# Patient Record
Sex: MALE | HISPANIC OR LATINO | Employment: FULL TIME | URBAN - METROPOLITAN AREA
[De-identification: names, ages, dates, MRNs, and addresses within clinical notes are randomized per-mention and may not be internally consistent; named-entity substitution may affect disease eponyms.]

---

## 2020-08-21 ENCOUNTER — HOSPITAL ENCOUNTER (INPATIENT)
Facility: HOSPITAL | Age: 30
LOS: 1 days | Discharge: HOME | DRG: 603 | End: 2020-08-25
Attending: EMERGENCY MEDICINE | Admitting: EMERGENCY MEDICINE

## 2020-08-21 DIAGNOSIS — L03.116 CELLULITIS OF LEFT LEG: Primary | ICD-10-CM

## 2020-08-21 DIAGNOSIS — M79.606 LEG PAIN: ICD-10-CM

## 2020-08-21 DIAGNOSIS — L02.419 ABSCESS OF LOWER LEG: ICD-10-CM

## 2020-08-21 LAB
ALBUMIN SERPL BCP-MCNC: 4.7 G/DL (ref 3.5–5.2)
ALP SERPL-CCNC: 82 U/L (ref 55–135)
ALT SERPL W/O P-5'-P-CCNC: 29 U/L (ref 10–44)
ANION GAP SERPL CALC-SCNC: 14 MMOL/L (ref 8–16)
AST SERPL-CCNC: 32 U/L (ref 10–40)
BASOPHILS # BLD AUTO: 0.05 K/UL (ref 0–0.2)
BASOPHILS NFR BLD: 0.4 % (ref 0–1.9)
BILIRUB SERPL-MCNC: 0.5 MG/DL (ref 0.1–1)
BUN SERPL-MCNC: 11 MG/DL (ref 6–20)
CALCIUM SERPL-MCNC: 9.5 MG/DL (ref 8.7–10.5)
CHLORIDE SERPL-SCNC: 103 MMOL/L (ref 95–110)
CO2 SERPL-SCNC: 21 MMOL/L (ref 23–29)
CREAT SERPL-MCNC: 0.9 MG/DL (ref 0.5–1.4)
D DIMER PPP IA.FEU-MCNC: 0.51 MG/L FEU
DIFFERENTIAL METHOD: ABNORMAL
EOSINOPHIL # BLD AUTO: 0.1 K/UL (ref 0–0.5)
EOSINOPHIL NFR BLD: 0.9 % (ref 0–8)
ERYTHROCYTE [DISTWIDTH] IN BLOOD BY AUTOMATED COUNT: 13.3 % (ref 11.5–14.5)
EST. GFR  (AFRICAN AMERICAN): >60 ML/MIN/1.73 M^2
EST. GFR  (NON AFRICAN AMERICAN): >60 ML/MIN/1.73 M^2
GLUCOSE SERPL-MCNC: 93 MG/DL (ref 70–110)
HCT VFR BLD AUTO: 39.1 % (ref 40–54)
HGB BLD-MCNC: 13.4 G/DL (ref 14–18)
IMM GRANULOCYTES # BLD AUTO: 0.04 K/UL (ref 0–0.04)
IMM GRANULOCYTES NFR BLD AUTO: 0.3 % (ref 0–0.5)
LACTATE SERPL-SCNC: 1.2 MMOL/L (ref 0.5–2.2)
LYMPHOCYTES # BLD AUTO: 1.9 K/UL (ref 1–4.8)
LYMPHOCYTES NFR BLD: 13.5 % (ref 18–48)
MCH RBC QN AUTO: 28.1 PG (ref 27–31)
MCHC RBC AUTO-ENTMCNC: 34.3 G/DL (ref 32–36)
MCV RBC AUTO: 82 FL (ref 82–98)
MONOCYTES # BLD AUTO: 1.3 K/UL (ref 0.3–1)
MONOCYTES NFR BLD: 9.5 % (ref 4–15)
NEUTROPHILS # BLD AUTO: 10.6 K/UL (ref 1.8–7.7)
NEUTROPHILS NFR BLD: 75.4 % (ref 38–73)
NRBC BLD-RTO: 0 /100 WBC
PLATELET # BLD AUTO: 391 K/UL (ref 150–350)
PMV BLD AUTO: 10 FL (ref 9.2–12.9)
POTASSIUM SERPL-SCNC: 3.7 MMOL/L (ref 3.5–5.1)
PROT SERPL-MCNC: 8.2 G/DL (ref 6–8.4)
RBC # BLD AUTO: 4.77 M/UL (ref 4.6–6.2)
SODIUM SERPL-SCNC: 138 MMOL/L (ref 136–145)
WBC # BLD AUTO: 14.06 K/UL (ref 3.9–12.7)

## 2020-08-21 PROCEDURE — 90471 IMMUNIZATION ADMIN: CPT | Performed by: EMERGENCY MEDICINE

## 2020-08-21 PROCEDURE — 96375 TX/PRO/DX INJ NEW DRUG ADDON: CPT

## 2020-08-21 PROCEDURE — 99285 EMERGENCY DEPT VISIT HI MDM: CPT | Mod: 25

## 2020-08-21 PROCEDURE — 83605 ASSAY OF LACTIC ACID: CPT

## 2020-08-21 PROCEDURE — 80053 COMPREHEN METABOLIC PANEL: CPT

## 2020-08-21 PROCEDURE — 87040 BLOOD CULTURE FOR BACTERIA: CPT | Mod: 59

## 2020-08-21 PROCEDURE — 85379 FIBRIN DEGRADATION QUANT: CPT

## 2020-08-21 PROCEDURE — 85025 COMPLETE CBC W/AUTO DIFF WBC: CPT

## 2020-08-21 PROCEDURE — 63600175 PHARM REV CODE 636 W HCPCS: Performed by: EMERGENCY MEDICINE

## 2020-08-21 PROCEDURE — 25000003 PHARM REV CODE 250: Performed by: EMERGENCY MEDICINE

## 2020-08-21 PROCEDURE — 90714 TD VACC NO PRESV 7 YRS+ IM: CPT | Performed by: EMERGENCY MEDICINE

## 2020-08-21 PROCEDURE — 96365 THER/PROPH/DIAG IV INF INIT: CPT

## 2020-08-21 RX ORDER — ONDANSETRON 2 MG/ML
4 INJECTION INTRAMUSCULAR; INTRAVENOUS
Status: COMPLETED | OUTPATIENT
Start: 2020-08-21 | End: 2020-08-21

## 2020-08-21 RX ORDER — MORPHINE SULFATE 10 MG/ML
4 INJECTION INTRAMUSCULAR; INTRAVENOUS; SUBCUTANEOUS
Status: COMPLETED | OUTPATIENT
Start: 2020-08-21 | End: 2020-08-21

## 2020-08-21 RX ORDER — KETOROLAC TROMETHAMINE 30 MG/ML
15 INJECTION, SOLUTION INTRAMUSCULAR; INTRAVENOUS
Status: COMPLETED | OUTPATIENT
Start: 2020-08-21 | End: 2020-08-21

## 2020-08-21 RX ORDER — HYDROCODONE BITARTRATE AND ACETAMINOPHEN 5; 325 MG/1; MG/1
1 TABLET ORAL
Status: COMPLETED | OUTPATIENT
Start: 2020-08-21 | End: 2020-08-21

## 2020-08-21 RX ADMIN — CEFTRIAXONE 1 G: 1 INJECTION, SOLUTION INTRAVENOUS at 09:08

## 2020-08-21 RX ADMIN — CLOSTRIDIUM TETANI TOXOID ANTIGEN (FORMALDEHYDE INACTIVATED) AND CORYNEBACTERIUM DIPHTHERIAE TOXOID ANTIGEN (FORMALDEHYDE INACTIVATED) 0.5 ML: 5; 2 INJECTION, SUSPENSION INTRAMUSCULAR at 09:08

## 2020-08-21 RX ADMIN — IOHEXOL 80 ML: 350 INJECTION, SOLUTION INTRAVENOUS at 11:08

## 2020-08-21 RX ADMIN — HYDROCODONE BITARTRATE AND ACETAMINOPHEN 1 TABLET: 5; 325 TABLET ORAL at 09:08

## 2020-08-21 RX ADMIN — MORPHINE SULFATE 4 MG: 10 INJECTION, SOLUTION INTRAMUSCULAR; INTRAVENOUS at 10:08

## 2020-08-21 RX ADMIN — KETOROLAC TROMETHAMINE 15 MG: 30 INJECTION, SOLUTION INTRAMUSCULAR at 09:08

## 2020-08-21 RX ADMIN — ONDANSETRON 4 MG: 2 INJECTION INTRAMUSCULAR; INTRAVENOUS at 10:08

## 2020-08-21 RX ADMIN — VANCOMYCIN HYDROCHLORIDE 1750 MG: 100 INJECTION, POWDER, LYOPHILIZED, FOR SOLUTION INTRAVENOUS at 10:08

## 2020-08-22 PROBLEM — L02.419 ABSCESS OF LOWER LEG: Status: ACTIVE | Noted: 2020-08-22

## 2020-08-22 LAB — SARS-COV-2 RDRP RESP QL NAA+PROBE: NEGATIVE

## 2020-08-22 PROCEDURE — 63600175 PHARM REV CODE 636 W HCPCS: Performed by: EMERGENCY MEDICINE

## 2020-08-22 PROCEDURE — 25000003 PHARM REV CODE 250: Performed by: STUDENT IN AN ORGANIZED HEALTH CARE EDUCATION/TRAINING PROGRAM

## 2020-08-22 PROCEDURE — U0002 COVID-19 LAB TEST NON-CDC: HCPCS

## 2020-08-22 PROCEDURE — 96375 TX/PRO/DX INJ NEW DRUG ADDON: CPT

## 2020-08-22 PROCEDURE — 25500020 PHARM REV CODE 255: Performed by: EMERGENCY MEDICINE

## 2020-08-22 PROCEDURE — 63600175 PHARM REV CODE 636 W HCPCS: Performed by: SURGERY

## 2020-08-22 PROCEDURE — 87186 SC STD MICRODIL/AGAR DIL: CPT

## 2020-08-22 PROCEDURE — 25000003 PHARM REV CODE 250: Performed by: EMERGENCY MEDICINE

## 2020-08-22 PROCEDURE — 94761 N-INVAS EAR/PLS OXIMETRY MLT: CPT

## 2020-08-22 PROCEDURE — G0378 HOSPITAL OBSERVATION PER HR: HCPCS

## 2020-08-22 PROCEDURE — 25000003 PHARM REV CODE 250: Performed by: SURGERY

## 2020-08-22 PROCEDURE — 87075 CULTR BACTERIA EXCEPT BLOOD: CPT

## 2020-08-22 PROCEDURE — 87077 CULTURE AEROBIC IDENTIFY: CPT

## 2020-08-22 PROCEDURE — 87070 CULTURE OTHR SPECIMN AEROBIC: CPT

## 2020-08-22 PROCEDURE — 96376 TX/PRO/DX INJ SAME DRUG ADON: CPT

## 2020-08-22 RX ORDER — SODIUM CHLORIDE 9 MG/ML
INJECTION, SOLUTION INTRAVENOUS CONTINUOUS
Status: DISCONTINUED | OUTPATIENT
Start: 2020-08-22 | End: 2020-08-22

## 2020-08-22 RX ORDER — ACETAMINOPHEN 325 MG/1
650 TABLET ORAL EVERY 6 HOURS
Status: DISCONTINUED | OUTPATIENT
Start: 2020-08-22 | End: 2020-08-22

## 2020-08-22 RX ORDER — IBUPROFEN 400 MG/1
800 TABLET ORAL
Status: COMPLETED | OUTPATIENT
Start: 2020-08-22 | End: 2020-08-22

## 2020-08-22 RX ORDER — LIDOCAINE HYDROCHLORIDE AND EPINEPHRINE 10; 10 MG/ML; UG/ML
30 INJECTION, SOLUTION INFILTRATION; PERINEURAL ONCE
Status: COMPLETED | OUTPATIENT
Start: 2020-08-22 | End: 2020-08-22

## 2020-08-22 RX ORDER — MORPHINE SULFATE 10 MG/ML
2 INJECTION INTRAMUSCULAR; INTRAVENOUS; SUBCUTANEOUS EVERY 4 HOURS PRN
Status: DISCONTINUED | OUTPATIENT
Start: 2020-08-22 | End: 2020-08-22

## 2020-08-22 RX ORDER — ACETAMINOPHEN 325 MG/1
650 TABLET ORAL EVERY 6 HOURS
Status: DISCONTINUED | OUTPATIENT
Start: 2020-08-22 | End: 2020-08-25 | Stop reason: HOSPADM

## 2020-08-22 RX ORDER — HYDROMORPHONE HYDROCHLORIDE 2 MG/ML
0.5 INJECTION, SOLUTION INTRAMUSCULAR; INTRAVENOUS; SUBCUTANEOUS ONCE
Status: COMPLETED | OUTPATIENT
Start: 2020-08-22 | End: 2020-08-22

## 2020-08-22 RX ORDER — IBUPROFEN 400 MG/1
400 TABLET ORAL EVERY 6 HOURS PRN
Status: DISCONTINUED | OUTPATIENT
Start: 2020-08-22 | End: 2020-08-25 | Stop reason: HOSPADM

## 2020-08-22 RX ORDER — OXYCODONE HYDROCHLORIDE 5 MG/1
5 TABLET ORAL EVERY 6 HOURS PRN
Status: DISCONTINUED | OUTPATIENT
Start: 2020-08-22 | End: 2020-08-25 | Stop reason: HOSPADM

## 2020-08-22 RX ORDER — ACETAMINOPHEN 325 MG/1
650 TABLET ORAL EVERY 8 HOURS PRN
Status: DISCONTINUED | OUTPATIENT
Start: 2020-08-22 | End: 2020-08-25 | Stop reason: HOSPADM

## 2020-08-22 RX ORDER — MORPHINE SULFATE 10 MG/ML
4 INJECTION INTRAMUSCULAR; INTRAVENOUS; SUBCUTANEOUS EVERY 4 HOURS PRN
Status: DISCONTINUED | OUTPATIENT
Start: 2020-08-22 | End: 2020-08-22

## 2020-08-22 RX ORDER — ONDANSETRON 2 MG/ML
4 INJECTION INTRAMUSCULAR; INTRAVENOUS EVERY 8 HOURS PRN
Status: DISCONTINUED | OUTPATIENT
Start: 2020-08-22 | End: 2020-08-25 | Stop reason: HOSPADM

## 2020-08-22 RX ADMIN — OXYCODONE HYDROCHLORIDE 5 MG: 5 TABLET ORAL at 05:08

## 2020-08-22 RX ADMIN — ACETAMINOPHEN 650 MG: 325 TABLET ORAL at 08:08

## 2020-08-22 RX ADMIN — ACETAMINOPHEN 650 MG: 325 TABLET ORAL at 11:08

## 2020-08-22 RX ADMIN — ACETAMINOPHEN 650 MG: 325 TABLET ORAL at 03:08

## 2020-08-22 RX ADMIN — IBUPROFEN 800 MG: 400 TABLET, FILM COATED ORAL at 01:08

## 2020-08-22 RX ADMIN — SODIUM CHLORIDE: 0.9 INJECTION, SOLUTION INTRAVENOUS at 04:08

## 2020-08-22 RX ADMIN — DEXTROSE MONOHYDRATE 1500 MG: 50 INJECTION, SOLUTION INTRAVENOUS at 11:08

## 2020-08-22 RX ADMIN — LIDOCAINE HYDROCHLORIDE AND EPINEPHRINE 30 ML: 10; 10 INJECTION, SOLUTION INFILTRATION; PERINEURAL at 09:08

## 2020-08-22 RX ADMIN — CEFTRIAXONE 1 G: 1 INJECTION, SOLUTION INTRAVENOUS at 09:08

## 2020-08-22 RX ADMIN — MORPHINE SULFATE 2 MG: 10 INJECTION, SOLUTION INTRAMUSCULAR; INTRAVENOUS at 04:08

## 2020-08-22 RX ADMIN — HYDROMORPHONE HYDROCHLORIDE 0.5 MG: 2 INJECTION, SOLUTION INTRAMUSCULAR; INTRAVENOUS; SUBCUTANEOUS at 09:08

## 2020-08-22 NOTE — PROGRESS NOTES
Pharmacokinetic Initial Assessment: IV Vancomycin    Assessment/Plan:    Initiate intravenous vancomycin with loading dose of 1750 mg once followed by a maintenance dose of vancomycin 1500 mg IV every 12 hours  Desired empiric serum trough concentration is 10 to 20 mcg/mL  Draw vancomycin trough level 30 min prior to fourth dose on 8/23/2020 at approximately 1030  Pharmacy will continue to follow and monitor vancomycin.      Please contact pharmacy at extension [phone] with any questions regarding this assessment.     Thank you for the consult,   Reddy Klein       Patient brief summary:  Francisco Diaz is a 30 y.o. male initiated on antimicrobial therapy with IV Vancomycin for treatment of suspected skin & soft tissue infection    Drug Allergies:   Review of patient's allergies indicates:  No Known Allergies    Actual Body Weight:   83.9 kg    Renal Function:   Estimated Creatinine Clearance: 117.5 mL/min (based on SCr of 0.9 mg/dL).,     Dialysis Method (if applicable):  N/A    CBC (last 72 hours):  Recent Labs   Lab Result Units 08/21/20  2109   WBC K/uL 14.06*   Hemoglobin g/dL 13.4*   Hematocrit % 39.1*   Platelets K/uL 391*   Gran% % 75.4*   Lymph% % 13.5*   Mono% % 9.5   Eosinophil% % 0.9   Basophil% % 0.4   Differential Method  Automated       Metabolic Panel (last 72 hours):  Recent Labs   Lab Result Units 08/21/20  2109   Sodium mmol/L 138   Potassium mmol/L 3.7   Chloride mmol/L 103   CO2 mmol/L 21*   Glucose mg/dL 93   BUN, Bld mg/dL 11   Creatinine mg/dL 0.9   Albumin g/dL 4.7   Total Bilirubin mg/dL 0.5   Alkaline Phosphatase U/L 82   AST U/L 32   ALT U/L 29       Drug levels (last 3 results):  No results for input(s): VANCOMYCINRA, VANCOMYCINPE, VANCOMYCINTR in the last 72 hours.    Microbiologic Results:  Microbiology Results (last 7 days)       Procedure Component Value Units Date/Time    Blood Culture #1 **CANNOT BE ORDERED STAT** [[id number]] Collected: 08/21/20 2110    Order  Status: Sent Specimen: Blood from Peripheral, Hand, Right Updated: 08/21/20 2137    Blood Culture #1 **CANNOT BE ORDERED STAT** [[id number]] Collected: 08/21/20 2100    Order Status: Sent Specimen: Blood from Peripheral, Hand, Left Updated: 08/21/20 2137

## 2020-08-22 NOTE — PLAN OF CARE
Patient is alert and oriented x 4.  Patient remained in low and locked position.  Call light within reach.  IV hydration initiated. Analgesic on plan of care.  Patient medicated once throughout shift.        Problem: Adult Inpatient Plan of Care  Goal: Plan of Care Review  Outcome: Ongoing, Not Progressing  Goal: Patient-Specific Goal (Individualization)  Outcome: Ongoing, Not Progressing  Goal: Absence of Hospital-Acquired Illness or Injury  Outcome: Ongoing, Not Progressing  Goal: Optimal Comfort and Wellbeing  Outcome: Ongoing, Not Progressing  Goal: Readiness for Transition of Care  Outcome: Ongoing, Not Progressing  Goal: Rounds/Family Conference  Outcome: Ongoing, Not Progressing

## 2020-08-22 NOTE — PLAN OF CARE
Patient remained free from falls and injury during shift, medication administered per MD orders,  prn medication administered with moderate relief, Incision and drainage done at bedside with Martti used during procedure with physician, patient tolerated well, patient is able to make needs known through out shift, patient safety maintained, bed in low locked position with call bell in reach, will continue to monitor.    Problem: Adult Inpatient Plan of Care  Goal: Plan of Care Review  Outcome: Ongoing, Progressing  Goal: Patient-Specific Goal (Individualization)  Outcome: Ongoing, Progressing  Goal: Absence of Hospital-Acquired Illness or Injury  Outcome: Ongoing, Progressing  Goal: Optimal Comfort and Wellbeing  Outcome: Ongoing, Progressing  Goal: Readiness for Transition of Care  Outcome: Ongoing, Progressing  Goal: Rounds/Family Conference  Outcome: Ongoing, Progressing

## 2020-08-22 NOTE — NURSING
Patient arrived on unit awake, alert and oriented.  IV sited noted.  Respirations are even and unlabored.  Patient is on RA.  Skin left left abrasion cellulitis.  Vitals assessed and patient oriented to room.  Bed in low and locked positon for patient's safety.  MARTTIused to complete admission.  IV hydration initiated and patient is NPO awaiting surgery consult.

## 2020-08-22 NOTE — ED NOTES
Pt cut L leg 2 weeks ago pain/swelling worsening over last 2 weeks until today pt unable to support weight on L leg.

## 2020-08-22 NOTE — ED PROVIDER NOTES
"Encounter Date: 8/21/2020    SCRIBE #1 NOTE: I, Betsy Tiff, am scribing for, and in the presence of,  Wang Azul MD. I have scribed the entire note.       History     Chief Complaint   Patient presents with    Leg Pain     Pateint c/o left leg swelling x 2 weeks secondary to "falling from car" and injuring leg.  Patient reports that he was able to walk, but now unable to place pressure on leg.  Left anterior leg red, swollen, and hot.      This is a 31 y/o male presenting to the ED with a CC of increasing left leg pain that began 2 weeks ago. Patient reports he had a superficial laceration to his left shin that has been healing.  Initially had significant bruising that resolved.  This week he developed some redness and mild pain.   Symptoms have been worsening slowly over the course of two weeks. He took 2 Tylenol without relief.  He has been able to walk due to the pain.  He admits of radiation of pain into the left thigh.  Admits of mild swelling of 1 of the lymph nodes in his left groin.  He denies fever or chills.  No other complaints.  Last Tetanus shot was about 20 years ago in [place]. He is not on any prescription medications. NKDA.  Patient is Lib[place]-speaking only.  History taking using language line.    The history is provided by the patient. The history is limited by a language barrier ([place]).     Review of patient's allergies indicates:  No Known Allergies  History reviewed. No pertinent past medical history.  History reviewed. No pertinent surgical history.  History reviewed. No pertinent family history.  Social History     Tobacco Use    Smoking status: Never Smoker    Smokeless tobacco: Never Used   Substance Use Topics    Alcohol use: Yes     Alcohol/week: 3.0 standard drinks     Types: 3 Cans of beer per week    Drug use: Never     Review of Systems   Constitutional: Negative for chills, diaphoresis and fever.   HENT: Negative for congestion, sinus pain and sore throat.  "   Respiratory: Negative for cough and shortness of breath.    Cardiovascular: Positive for leg swelling.   Gastrointestinal: Negative for abdominal pain, diarrhea, nausea and vomiting.   Genitourinary: Negative for dysuria.   Musculoskeletal: Negative for back pain and joint swelling.        Positive for left leg pain   Skin: Positive for rash and wound.        Positive for redness to left leg  Positive for bruising to left leg  Positive for healing laceration to left leg   Neurological: Negative for weakness, numbness and headaches.       Physical Exam     Initial Vitals [08/21/20 2045]   BP Pulse Resp Temp SpO2   (!) 143/83 107 (!) 22 99.1 °F (37.3 °C) 99 %      MAP       --         Physical Exam    Nursing note and vitals reviewed.  Constitutional: He appears well-developed and well-nourished. He is not diaphoretic. No distress.   Patient appears uncomfortable due to pain.   HENT:   Head: Normocephalic and atraumatic.   Right Ear: External ear normal.   Left Ear: External ear normal.   Nose: Nose normal.   Eyes: Conjunctivae and EOM are normal. Pupils are equal, round, and reactive to light. Right eye exhibits no discharge. Left eye exhibits no discharge. No scleral icterus.   Neck: Normal range of motion. Neck supple. No tracheal deviation present.   Cardiovascular: Normal rate, regular rhythm and normal heart sounds.   No murmur heard.  Pulmonary/Chest: Breath sounds normal. No respiratory distress. He has no wheezes. He has no rhonchi. He has no rales.   Abdominal: Soft. Bowel sounds are normal. He exhibits no distension. There is no abdominal tenderness. There is no rebound and no guarding.   Musculoskeletal: Normal range of motion.      Comments: Normal left knee.  Normal left hip.  Normal left ankle.  Moderate swelling over the left shin with fluctuance.  No drainage.  No pustules.  No vesicles.   Neurological: He is alert and oriented to person, place, and time. No cranial nerve deficit.   Skin: Skin is  warm and dry. Bruising and laceration noted. No rash noted. There is erythema. No pallor.   Healing laceration to left shin with moderate swelling, bruising, and redness  Left shin is tender up to knee   Psychiatric: He has a normal mood and affect. His behavior is normal. Judgment and thought content normal.         ED Course   Procedures  Labs Reviewed   CULTURE, BLOOD   CULTURE, BLOOD   CBC W/ AUTO DIFFERENTIAL   COMPREHENSIVE METABOLIC PANEL   D DIMER, QUANTITATIVE   LACTIC ACID, PLASMA          Imaging Results    None          Medical Decision Making:   Initial Assessment:   This is a 29 y/o male presenting to the ED with a CC of increasing left leg pain that began 2 weeks ago.  Exam consistent with abscess versus cellulitis of the soft tissues of the left lower extremity.  Patient is afebrile.  Patient mildly tachycardic.  Labs and x-rays will be ordered. Patient will be treated for symptoms and reassessed.     Differential Diagnosis:   Cellulitis, abscess, DVT  Clinical Tests:   Lab Tests: Ordered and Reviewed  Radiological Study: Ordered and Reviewed  ED Management:  0100:  No evidence of DVT.  No evidence of fracture.  White blood cell count moderately elevated.  Remainder of lab work is unremarkable.  CT shows large fluid collection over the anterior leg consistent with abscess and cellulitis.  Given the severity of pain associated with the abscess the size of the fluid collection I will admit the patient for surgical consult for incision and drainage and continue dosing of antibiotics to cover for likely skin pathogens such as staphylococcus and Streptococcus.            Scribe Attestation:   Scribe #1: I performed the above scribed service and the documentation accurately describes the services I performed. I attest to the accuracy of the note.                          Clinical Impression:       ICD-10-CM ICD-9-CM   1. Cellulitis of left leg  L03.116 682.6   2. Leg pain  M79.606 729.5   3. Abscess of  lower leg  L02.419 682.6         Disposition:   Disposition: Placed in Observation  Condition: Stable     I, Wang Azul MD, personally performed the services described in this documentation. All medical record entries made by the scribe were at my direction and in my presence.  I have reviewed the chart and agree that the record reflects my personal performance and is accurate and complete.                      Wang Azul MD  08/22/20 0102

## 2020-08-22 NOTE — H&P
[facility]  History & Physical    SUBJECTIVE:     Chief Complaint/Reason for Admission: LLE abscess with cellulitis    History of Present Illness:  Francisco Diaz is a 30 y.o. male with no significant PMHx, who presents with LLE swelling, erythema, and pain. He states that his symptoms onset approximately 2 weeks ago after he fell off the back of a truck and hit his anteromedial leg on the bumper. His symptoms progressively worsened. He states he is unable to walk due to the pain. He is able to bend his knee. He denies associated fever, chills, nausea, vomiting, or other systemic symptoms.     PMHx: No significant PMHX  PSHx: No significant PSHx  SHx: 3 beers daily  FMHx: Non-contirubtory  Meds: None  Allergies: NKDA     Review of Systems:  Constitutional: no fever or chills  Eyes: no visual changes  ENT: no nasal congestion or sore throat  Respiratory: no cough or shortness of breath  Cardiovascular: no chest pain or palpitations  Gastrointestinal: no nausea or vomiting, no abdominal pain or change in bowel habits  Genitourinary: no hematuria or dysuria  Hematologic/Lymphatic: no easy bruising or lymphadenopathy  Musculoskeletal: positive for LLE swelling, erythema, pain  Neurological: no seizures or tremors  Behavioral/Psych: no auditory or visual hallucinations  Endocrine: no heat or cold intolerance    OBJECTIVE:     Vital Signs (Most Recent):  Temp: 98.9 °F (37.2 °C) (08/22/20 0722)  Pulse: 86 (08/22/20 0722)  Resp: 17 (08/22/20 0722)  BP: 119/80 (08/22/20 0722)  SpO2: 100 % (08/22/20 0722)    Physical Exam:  General: Well developed, well nourished. Alert and in NAD.  HEENT: NCAT. PERRL. EOM grossly intact. Moist mucus membranes. No erythema or exudates in the posterior oropharynx. No cervical lymphadenopathy. No thyromegaly.   Cardiovasular: RRR with no M/R/G. DP 2+ bilaterally.  Pulmonary: CTAB without wheezes, rhales, or rhonchi. No accessory muscle use.  Abdominal: Soft,  non-tender and non-distended. Normal bowel sounds.  Genitourinary: Deferred.  Musculoskeletal: Full ROM. Normal muscle tone. No BLE edema.  Neurological: A&O x 4. CN II-XII grossly intact.  Skin:   LLE: proximal anteromedial leg has an area of fluctuance, erythema, tenderness, and superficial healing abrasion.  Psych: Appropriate mood and affect.     Laboratory:  CBC:   Recent Labs   Lab 08/21/20 2109   WBC 14.06*   RBC 4.77   HGB 13.4*   HCT 39.1*   *   MCV 82   MCH 28.1   MCHC 34.3     BMP:   Recent Labs   Lab 08/21/20 2109   GLU 93      K 3.7      CO2 21*   BUN 11   CREATININE 0.9   CALCIUM 9.5       Diagnostic Results:  CT LLE: 2.3 x 6.1 x 7.8 cm fluid collection    ASSESSMENT/PLAN:   Francisco Diaz is a 30 y.o. male with no significant PMHx, who presents with LLE swelling, erythema, and pain onset following a fall 2 weeks ago. Febrile to 102.2. Exam with fluctuance and erythema over the anteromedial leg. CT with 2.3 x 6.1 x 7.8 cm fluid collection    - Admit  - Continue empiric Vanc/Rocephin  - I&D at bedside today  - Pain: APAP, Oxy    H&P obtained using hospital provided translation service.    Cornel Peterson MD PGY3  [facility] General Surgery  8:10 AM 8/22/2020

## 2020-08-22 NOTE — ED NOTES
Report called to 4th floor RN. VSS with elevated temp noted. Awaiting transport to floor at this time

## 2020-08-23 LAB
ANION GAP SERPL CALC-SCNC: 9 MMOL/L (ref 8–16)
BASOPHILS # BLD AUTO: 0.03 K/UL (ref 0–0.2)
BASOPHILS NFR BLD: 0.2 % (ref 0–1.9)
BUN SERPL-MCNC: 9 MG/DL (ref 6–20)
CALCIUM SERPL-MCNC: 9.2 MG/DL (ref 8.7–10.5)
CHLORIDE SERPL-SCNC: 100 MMOL/L (ref 95–110)
CO2 SERPL-SCNC: 24 MMOL/L (ref 23–29)
CREAT SERPL-MCNC: 0.8 MG/DL (ref 0.5–1.4)
DIFFERENTIAL METHOD: ABNORMAL
EOSINOPHIL # BLD AUTO: 0 K/UL (ref 0–0.5)
EOSINOPHIL NFR BLD: 0.3 % (ref 0–8)
ERYTHROCYTE [DISTWIDTH] IN BLOOD BY AUTOMATED COUNT: 13.2 % (ref 11.5–14.5)
EST. GFR  (AFRICAN AMERICAN): >60 ML/MIN/1.73 M^2
EST. GFR  (NON AFRICAN AMERICAN): >60 ML/MIN/1.73 M^2
GLUCOSE SERPL-MCNC: 114 MG/DL (ref 70–110)
HCT VFR BLD AUTO: 38.8 % (ref 40–54)
HGB BLD-MCNC: 13.2 G/DL (ref 14–18)
IMM GRANULOCYTES # BLD AUTO: 0.05 K/UL (ref 0–0.04)
IMM GRANULOCYTES NFR BLD AUTO: 0.4 % (ref 0–0.5)
LYMPHOCYTES # BLD AUTO: 1.6 K/UL (ref 1–4.8)
LYMPHOCYTES NFR BLD: 10.9 % (ref 18–48)
MCH RBC QN AUTO: 28.2 PG (ref 27–31)
MCHC RBC AUTO-ENTMCNC: 34 G/DL (ref 32–36)
MCV RBC AUTO: 83 FL (ref 82–98)
MONOCYTES # BLD AUTO: 1.5 K/UL (ref 0.3–1)
MONOCYTES NFR BLD: 10.8 % (ref 4–15)
NEUTROPHILS # BLD AUTO: 11.1 K/UL (ref 1.8–7.7)
NEUTROPHILS NFR BLD: 77.4 % (ref 38–73)
NRBC BLD-RTO: 0 /100 WBC
PLATELET # BLD AUTO: 338 K/UL (ref 150–350)
PMV BLD AUTO: 9.5 FL (ref 9.2–12.9)
POTASSIUM SERPL-SCNC: 3.6 MMOL/L (ref 3.5–5.1)
RBC # BLD AUTO: 4.68 M/UL (ref 4.6–6.2)
SODIUM SERPL-SCNC: 133 MMOL/L (ref 136–145)
WBC # BLD AUTO: 14.28 K/UL (ref 3.9–12.7)

## 2020-08-23 PROCEDURE — G0378 HOSPITAL OBSERVATION PER HR: HCPCS

## 2020-08-23 PROCEDURE — 63600175 PHARM REV CODE 636 W HCPCS: Performed by: SURGERY

## 2020-08-23 PROCEDURE — 63600175 PHARM REV CODE 636 W HCPCS: Performed by: STUDENT IN AN ORGANIZED HEALTH CARE EDUCATION/TRAINING PROGRAM

## 2020-08-23 PROCEDURE — 25000003 PHARM REV CODE 250: Performed by: STUDENT IN AN ORGANIZED HEALTH CARE EDUCATION/TRAINING PROGRAM

## 2020-08-23 PROCEDURE — 96375 TX/PRO/DX INJ NEW DRUG ADDON: CPT

## 2020-08-23 PROCEDURE — 36415 COLL VENOUS BLD VENIPUNCTURE: CPT

## 2020-08-23 PROCEDURE — 80048 BASIC METABOLIC PNL TOTAL CA: CPT

## 2020-08-23 PROCEDURE — 85025 COMPLETE CBC W/AUTO DIFF WBC: CPT

## 2020-08-23 PROCEDURE — 25000003 PHARM REV CODE 250: Performed by: SURGERY

## 2020-08-23 PROCEDURE — 94761 N-INVAS EAR/PLS OXIMETRY MLT: CPT

## 2020-08-23 PROCEDURE — 96376 TX/PRO/DX INJ SAME DRUG ADON: CPT

## 2020-08-23 RX ORDER — CEFEPIME HYDROCHLORIDE 1 G/50ML
2 INJECTION, SOLUTION INTRAVENOUS
Status: DISCONTINUED | OUTPATIENT
Start: 2020-08-23 | End: 2020-08-24

## 2020-08-23 RX ADMIN — CEFEPIME HYDROCHLORIDE 2 G: 2 INJECTION, SOLUTION INTRAVENOUS at 12:08

## 2020-08-23 RX ADMIN — ACETAMINOPHEN 650 MG: 325 TABLET ORAL at 05:08

## 2020-08-23 RX ADMIN — ACETAMINOPHEN 650 MG: 325 TABLET ORAL at 06:08

## 2020-08-23 RX ADMIN — ACETAMINOPHEN 650 MG: 325 TABLET ORAL at 11:08

## 2020-08-23 RX ADMIN — DEXTROSE MONOHYDRATE 1500 MG: 50 INJECTION, SOLUTION INTRAVENOUS at 09:08

## 2020-08-23 RX ADMIN — DEXTROSE MONOHYDRATE 1500 MG: 50 INJECTION, SOLUTION INTRAVENOUS at 08:08

## 2020-08-23 RX ADMIN — OXYCODONE HYDROCHLORIDE 5 MG: 5 TABLET ORAL at 07:08

## 2020-08-23 RX ADMIN — OXYCODONE HYDROCHLORIDE 5 MG: 5 TABLET ORAL at 03:08

## 2020-08-23 RX ADMIN — OXYCODONE HYDROCHLORIDE 5 MG: 5 TABLET ORAL at 09:08

## 2020-08-23 NOTE — PLAN OF CARE
AAO x 4, medication administered per MD orders, PRN pain medication administered with moderate relief, IV abx administered, Dressing to lower ext changed patient got it wet, patient tolerated diet well, patient able to make needs known through out shift, patient safety maintained bed in low locked position with call bell in reach, will continue to monitor.   Problem: Adult Inpatient Plan of Care  Goal: Plan of Care Review  Outcome: Ongoing, Progressing  Goal: Patient-Specific Goal (Individualization)  Outcome: Ongoing, Progressing  Goal: Absence of Hospital-Acquired Illness or Injury  Outcome: Ongoing, Progressing  Goal: Optimal Comfort and Wellbeing  Outcome: Ongoing, Progressing  Goal: Readiness for Transition of Care  Outcome: Ongoing, Progressing  Goal: Rounds/Family Conference  Outcome: Ongoing, Progressing

## 2020-08-23 NOTE — PROGRESS NOTES
[facility]  General Surgery  Progress Note    Subjective:     Interval History:   NAEO  TMax 100.4  Pain and swelling slightly improved  Redness slightly improved.  Some serosanguinous drainage.    Post-Op Info:  * No surgery found *          Medications:  Continuous Infusions:  Scheduled Meds:   acetaminophen  650 mg Oral Q6H    ceFEPime (MAXIPIME) IVPB  2 g Intravenous Q12H    vancomycin (VANCOCIN) IVPB  1,500 mg Intravenous Q12H     PRN Meds:acetaminophen, ibuprofen, ondansetron, oxyCODONE, Pharmacy to dose Vancomycin consult **AND** vancomycin - pharmacy to dose     Objective:     Vital Signs (Most Recent):  Temp: 99.3 °F (37.4 °C) (08/23/20 0731)  Pulse: 97 (08/23/20 0731)  Resp: 16 (08/23/20 0737)  BP: 132/81 (08/23/20 0731)  SpO2: (!) 94 % (08/23/20 0731) Vital Signs (24h Range):  Temp:  [98.2 °F (36.8 °C)-100.4 °F (38 °C)] 99.3 °F (37.4 °C)  Pulse:  [] 97  Resp:  [16-19] 16  SpO2:  [94 %-99 %] 94 %  BP: (119-132)/(71-81) 132/81       Intake/Output Summary (Last 24 hours) at 8/23/2020 1135  Last data filed at 8/23/2020 0822  Gross per 24 hour   Intake 720 ml   Output 500 ml   Net 220 ml       Physical Exam   General: Alert and in NAD.  HEENT: NCAT  Cardiovasular: RRR  Pulmonary: Symmetric, no distress  Abdominal: Soft, non-distended, and non-tender.   Musculoskeletal:   LLE: erythema over the left anteromedial leg, scant serosanguinous drainage. No fluctuance to suggest un-drained collection   Neurological: Alert and appropriate.      Significant Labs:  CBC:   Recent Labs   Lab 08/23/20  0938   WBC 14.28*   RBC 4.68   HGB 13.2*   HCT 38.8*      MCV 83   MCH 28.2   MCHC 34.0       Significant Diagnostics:  None    Assessment/Plan:   Francisco Diaz is a 30 y.o. male with no significant PMHx, who presents with LLE swelling, erythema, and pain onset following a fall 2 weeks ago. Febrile to 102.2. Exam with fluctuance and erythema over the anteromedial leg. CT with  2.3 x 6.1 x 7.8 cm fluid collection. Underwent bedside I&D    Still with cellulitis and low grade temperature. WBC 14.2    - Continue Vanc/Cefepime  - Local wound care  - Pain: APAP, Oxy   - Possible discharge Monday pending improvement in cellulitis     Cornel Peterson MD  General Surgery  [facility]

## 2020-08-23 NOTE — PROCEDURES
Incision and Drainage Procedure Note    Pre-operative Diagnosis: LLE cellulitis with underlying fluid collection    Post-operative Diagnosis: same    Indications: LLE cellulitis with underlying fluid collection      Anesthesia: 1% lidocaine with epinephrine    Procedure Details   The procedure, risks and complications have been discussed in detail (including, but not limited to airway compromise, infection, bleeding) with the patient, and the patient has signed consent to the procedure.    The skin was sterilely prepped and draped over the affected area in the usual fashion.  After adequate local anesthesia, I&D with a #11 blade was performed on the left anteromedial leg. Serosanguinous drainage was obtained. Wound was explored with hemostat to break up any loculations Cultures were sent. Wound was packed with 1/4 inch iodoform packing.    Findings:  Moderate serosanguinous drainage, trace purulent drainage    EBL: 10 cc's    Drains: None    Condition:  Stable    Complications:  none.

## 2020-08-23 NOTE — PLAN OF CARE
Patient is alert and oriented x 4.  Mobility status is a level IV.  Call light within reach.  Bed in low and locked position.  IV antibiotics continues.        Problem: Adult Inpatient Plan of Care  Goal: Plan of Care Review  Outcome: Ongoing, Progressing  Goal: Patient-Specific Goal (Individualization)  Outcome: Ongoing, Progressing  Goal: Absence of Hospital-Acquired Illness or Injury  Outcome: Ongoing, Progressing  Goal: Optimal Comfort and Wellbeing  Outcome: Ongoing, Progressing  Goal: Readiness for Transition of Care  Outcome: Ongoing, Progressing  Goal: Rounds/Family Conference  Outcome: Ongoing, Progressing

## 2020-08-24 PROBLEM — L03.116 CELLULITIS OF LEFT LEG: Status: ACTIVE | Noted: 2020-08-24

## 2020-08-24 LAB
BACTERIA SPEC AEROBE CULT: ABNORMAL
VANCOMYCIN TROUGH SERPL-MCNC: 7.3 UG/ML (ref 10–22)

## 2020-08-24 PROCEDURE — 96376 TX/PRO/DX INJ SAME DRUG ADON: CPT

## 2020-08-24 PROCEDURE — 36415 COLL VENOUS BLD VENIPUNCTURE: CPT

## 2020-08-24 PROCEDURE — 80202 ASSAY OF VANCOMYCIN: CPT

## 2020-08-24 PROCEDURE — 11000001 HC ACUTE MED/SURG PRIVATE ROOM

## 2020-08-24 PROCEDURE — 63600175 PHARM REV CODE 636 W HCPCS: Performed by: STUDENT IN AN ORGANIZED HEALTH CARE EDUCATION/TRAINING PROGRAM

## 2020-08-24 PROCEDURE — 25000003 PHARM REV CODE 250: Performed by: STUDENT IN AN ORGANIZED HEALTH CARE EDUCATION/TRAINING PROGRAM

## 2020-08-24 PROCEDURE — 63600175 PHARM REV CODE 636 W HCPCS: Performed by: SURGERY

## 2020-08-24 PROCEDURE — 94761 N-INVAS EAR/PLS OXIMETRY MLT: CPT

## 2020-08-24 PROCEDURE — 25000003 PHARM REV CODE 250: Performed by: SURGERY

## 2020-08-24 RX ORDER — CLINDAMYCIN PHOSPHATE 900 MG/50ML
900 INJECTION, SOLUTION INTRAVENOUS
Status: DISCONTINUED | OUTPATIENT
Start: 2020-08-24 | End: 2020-08-25 | Stop reason: HOSPADM

## 2020-08-24 RX ORDER — LIDOCAINE HYDROCHLORIDE AND EPINEPHRINE 10; 10 MG/ML; UG/ML
10 INJECTION, SOLUTION INFILTRATION; PERINEURAL ONCE
Status: COMPLETED | OUTPATIENT
Start: 2020-08-24 | End: 2020-08-24

## 2020-08-24 RX ADMIN — ACETAMINOPHEN 650 MG: 325 TABLET ORAL at 12:08

## 2020-08-24 RX ADMIN — DEXTROSE MONOHYDRATE 1500 MG: 50 INJECTION, SOLUTION INTRAVENOUS at 08:08

## 2020-08-24 RX ADMIN — CEFEPIME HYDROCHLORIDE 2 G: 2 INJECTION, SOLUTION INTRAVENOUS at 12:08

## 2020-08-24 RX ADMIN — CLINDAMYCIN IN 5 PERCENT DEXTROSE 900 MG: 18 INJECTION, SOLUTION INTRAVENOUS at 11:08

## 2020-08-24 RX ADMIN — CLINDAMYCIN IN 5 PERCENT DEXTROSE 900 MG: 18 INJECTION, SOLUTION INTRAVENOUS at 03:08

## 2020-08-24 RX ADMIN — LIDOCAINE HYDROCHLORIDE AND EPINEPHRINE 10 ML: 10; 10 INJECTION, SOLUTION INFILTRATION; PERINEURAL at 04:08

## 2020-08-24 RX ADMIN — ACETAMINOPHEN 650 MG: 325 TABLET ORAL at 05:08

## 2020-08-24 RX ADMIN — ACETAMINOPHEN 650 MG: 325 TABLET ORAL at 06:08

## 2020-08-24 NOTE — PLAN OF CARE
08/24/20 1633   Discharge Assessment   Assessment Type Discharge Planning Assessment   Confirmed/corrected address and phone number on facesheet? Yes   Assessment information obtained from? Patient   Expected Length of Stay (days) 2   Communicated expected length of stay with patient/caregiver yes   Prior to hospitilization cognitive status: Alert/Oriented   Prior to hospitalization functional status: Independent   Current cognitive status: Alert/Oriented   Current Functional Status: Independent   Facility Arrived From: Home   Lives With child(hermelindo), dependent;spouse   Able to Return to Prior Arrangements yes   Is patient able to care for self after discharge? Yes   Who are your caregiver(s) and their phone number(s)? Spouse: Celina Garvin [phone]   Patient's perception of discharge disposition home or selfcare   Readmission Within the Last 30 Days no previous admission in last 30 days   Patient currently being followed by outpatient case management? No   Patient currently receives any other outside agency services? No   Equipment Currently Used at Home none   Do you have any problems affording any of your prescribed medications? No   Does the patient have transportation home? Yes   Transportation Anticipated family or friend will provide   Dialysis Name and Scheduled days N/A   Does the patient receive services at the [facility]? No   Discharge Plan A Home with family   DME Needed Upon Discharge  none   Patient/Family in Agreement with Plan yes       Discharge Planning Assessment completed with assistance of Language Line Service  ([phone]; : Ever; ID #[id number]) and home needs addressed with patient at bedside. Patient confirmed information on Face Sheet as correct.     SW Role Explained.  Patient identified by using 3 identifiers: name, date of birth and home address.      Patient's Disposition: Home w/ spouse and children     Patient confirmed that HELP AT HOME will be from:  Spouse    Patient reports no HME or HME needs     Patient has no PCP; will refer to [facility]     SW placed name and contact number on Communicate Board. Pt informed to contact JOSÉ MIGUEL for all discharge needs/questions.

## 2020-08-24 NOTE — NURSING
Pt resting quietly in bed with eyes closed. Resp unlabored. NAD noted. Easily arousable to verbal stimuli. Offers no complaints at present. Awaiting MD orders/ disposition. Bed rails up x 2 with bed locked in lowest position. Call light in reach. Will continue to monitor. ADMIT evaluation continues.

## 2020-08-24 NOTE — NURSING
Assumed care of pt bed 28. Pt with swelling, redness, and tenderness to LLE. Pt received I&D for abscess to LLE. Leg dressed with 4x4 gauze and tape. Redness extends from below knee to L foot. Pt with +1 edema and swelling to left ankle. Pt reports pain and tenderness during assessment. Scant purulent drainage noted on dressing. Pt reports pain when bearing weight to LLE. +2 Pedal pulse bilaterally. Pt reports pain with movement. Assessment done per flowsheet. NAD noted. No cardiac monitor. Awaiting MD orders/disposition. Bed rails up x 2 with bed locked in lowest position. Call light in reach. Will continue to monitor. ADMIT evaluation continues. Pt verbalizes understanding of plan of care.

## 2020-08-24 NOTE — PROGRESS NOTES
Pharmacokinetic Assessment Follow Up: IV Vancomycin    Vancomycin serum concentration assessment(s):    The trough level was drawn correctly and can be used to guide therapy at this time. The measurement is below the desired definitive target range of 10 to 20 mcg/mL.    Vancomycin Regimen Plan:    Change regimen to Vancomycin 1750 mg IV every 12 hours with next serum trough concentration measured at 0715 prior to 4th dose on 8/26/2020 .  First dose due at 2015 today (8/24).    Drug levels (last 3 results):  Recent Labs   Lab Result Units 08/24/20  0715   Vancomycin-Trough ug/mL 7.3*       Pharmacy will continue to follow and monitor vancomycin.    Please contact pharmacy at extension [phone] for questions regarding this assessment.    Thank you for the consult,   Johnny Sands Jr       Patient brief summary:  Francisco Diaz is a 30 y.o. male initiated on antimicrobial therapy with IV Vancomycin for treatment of skin & soft tissue infection    Drug Allergies:   Review of patient's allergies indicates:  No Known Allergies    Actual Body Weight:   83.9 kg    Renal Function:   Estimated Creatinine Clearance: 132.2 mL/min (based on SCr of 0.8 mg/dL).,     Dialysis Method (if applicable):  N/A    CBC (last 72 hours):  Recent Labs   Lab Result Units 08/21/20 2109 08/23/20  0938   WBC K/uL 14.06* 14.28*   Hemoglobin g/dL 13.4* 13.2*   Hematocrit % 39.1* 38.8*   Platelets K/uL 391* 338   Gran% % 75.4* 77.4*   Lymph% % 13.5* 10.9*   Mono% % 9.5 10.8   Eosinophil% % 0.9 0.3   Basophil% % 0.4 0.2   Differential Method  Automated Automated       Metabolic Panel (last 72 hours):  Recent Labs   Lab Result Units 08/21/20 2109 08/23/20  0938   Sodium mmol/L 138 133*   Potassium mmol/L 3.7 3.6   Chloride mmol/L 103 100   CO2 mmol/L 21* 24   Glucose mg/dL 93 114*   BUN, Bld mg/dL 11 9   Creatinine mg/dL 0.9 0.8   Albumin g/dL 4.7  --    Total Bilirubin mg/dL 0.5  --    Alkaline Phosphatase U/L 82  --    AST U/L 32  --     ALT U/L 29  --        Vancomycin Administrations:  vancomycin given in the last 96 hours                     vancomycin 1.5 g in dextrose 5 % 250 mL IVPB (ready to mix) (mg) 1,500 mg New Bag 08/24/20 0833     1,500 mg New Bag 08/23/20 2110     1,500 mg New Bag  0825     1,500 mg New Bag 08/22/20 1157    vancomycin (VANCOCIN) 1,750 mg in dextrose 5 % 500 mL IVPB (mg) 1,750 mg New Bag 08/21/20 2258                    Microbiologic Results:  Microbiology Results (last 7 days)       Procedure Component Value Units Date/Time    Aerobic culture [[id number]]  (Abnormal)  (Susceptibility) Collected: 08/22/20 1016    Order Status: Completed Specimen: Abscess from Leg, Left Updated: 08/24/20 0804     Aerobic Bacterial Culture STAPHYLOCOCCUS AUREUS  Many      Culture, Anaerobe [[id number]] Collected: 08/22/20 1016    Order Status: Completed Specimen: Abscess from Leg, Left Updated: 08/24/20 0724     Anaerobic Culture Culture in progress    Blood Culture #1 **CANNOT BE ORDERED STAT** [[id number]] Collected: 08/21/20 2100    Order Status: Completed Specimen: Blood from Peripheral, Hand, Left Updated: 08/23/20 2303     Blood Culture, Routine No Growth to date      No Growth to date      No Growth to date    Blood Culture #1 **CANNOT BE ORDERED STAT** [[id number]] Collected: 08/21/20 2110    Order Status: Completed Specimen: Blood from Peripheral, Hand, Right Updated: 08/23/20 2303     Blood Culture, Routine No Growth to date      No Growth to date      No Growth to date

## 2020-08-24 NOTE — PROGRESS NOTES
[facility]  General Surgery  Progress Note    Subjective:     Interval History:   NAEO. Pain somewhat improved, but still present  TMax 100.    Post-Op Info:  * No surgery found *          Medications:  Continuous Infusions:  Scheduled Meds:   acetaminophen  650 mg Oral Q6H    clindamycin (CLEOCIN) IVPB  900 mg Intravenous Q8H     PRN Meds:acetaminophen, ibuprofen, ondansetron, oxyCODONE, Pharmacy to dose Vancomycin consult **AND** vancomycin - pharmacy to dose     Objective:     Vital Signs (Most Recent):  Temp: 98.4 °F (36.9 °C) (08/24/20 0718)  Pulse: 85 (08/24/20 0718)  Resp: 18 (08/24/20 0718)  BP: 120/64 (08/24/20 0718)  SpO2: 96 % (08/24/20 0718) Vital Signs (24h Range):  Temp:  [98.4 °F (36.9 °C)-100.1 °F (37.8 °C)] 98.4 °F (36.9 °C)  Pulse:  [] 85  Resp:  [16-18] 18  SpO2:  [96 %-99 %] 96 %  BP: (120-136)/(64-93) 120/64       Intake/Output Summary (Last 24 hours) at 8/24/2020 0959  Last data filed at 8/23/2020 1713  Gross per 24 hour   Intake 480 ml   Output --   Net 480 ml       Physical Exam   General: Alert and in NAD.  HEENT: NCAT  Cardiovasular: RRR  Pulmonary: Symmetric, no distress  Abdominal: Soft, non-distended, and non-tender.   Musculoskeletal:   LLE: persistent erythema and induration around small, 1 cm I&D site. No purulent drainage. No fluctuance  Neurological: Alert and appropriate.      Significant Labs:  CBC:   Recent Labs   Lab 08/23/20  0938   WBC 14.28*   RBC 4.68   HGB 13.2*   HCT 38.8*      MCV 83   MCH 28.2   MCHC 34.0       Significant Diagnostics:  None    Assessment/Plan:   Francisco Diza is a 30 y.o. male with no significant PMHx, who presents with LLE swelling, erythema, and pain onset following a fall 2 weeks ago. Febrile to 102.2. Exam with fluctuance and erythema over the anteromedial leg. CT with 2.3 x 6.1 x 7.8 cm fluid collection. Underwent bedside I&D    Still with cellulitis and low grade temperature yesterday. Eloina flores found  to be low. Cx found pan sensitive MSSA.    - stop vanc/cefepime. Start clindamycin  - US for additional undrained fluid collection  - Local wound care  - Pain: APAP, Oxy       Estephania Sotomayor MD  General Surgery  [facility] [place]

## 2020-08-24 NOTE — NURSING
Pt resting quietly in bed and sitting up. Pts resp even and unlabored. NAD noted. Easily arousable to verbal stimuli. Offers complaints of pain to LLE. Leg remains warm, red, and swollen. Pt continues to report tenderness during assessment. Awaiting MD orders / disposition. Bed rails up x 2 with bed locked in lowest position. Call light in reach. Will continue to monitor. ADMIT evaluation continues.

## 2020-08-25 VITALS
HEART RATE: 91 BPM | SYSTOLIC BLOOD PRESSURE: 134 MMHG | TEMPERATURE: 99 F | HEIGHT: 65 IN | BODY MASS INDEX: 30.81 KG/M2 | DIASTOLIC BLOOD PRESSURE: 88 MMHG | OXYGEN SATURATION: 98 % | RESPIRATION RATE: 17 BRPM | WEIGHT: 184.94 LBS

## 2020-08-25 LAB
BASOPHILS # BLD AUTO: 0.03 K/UL (ref 0–0.2)
BASOPHILS NFR BLD: 0.4 % (ref 0–1.9)
DIFFERENTIAL METHOD: ABNORMAL
EOSINOPHIL # BLD AUTO: 0.2 K/UL (ref 0–0.5)
EOSINOPHIL NFR BLD: 2.3 % (ref 0–8)
ERYTHROCYTE [DISTWIDTH] IN BLOOD BY AUTOMATED COUNT: 12.8 % (ref 11.5–14.5)
HCT VFR BLD AUTO: 38.9 % (ref 40–54)
HGB BLD-MCNC: 13.2 G/DL (ref 14–18)
IMM GRANULOCYTES # BLD AUTO: 0.02 K/UL (ref 0–0.04)
IMM GRANULOCYTES NFR BLD AUTO: 0.2 % (ref 0–0.5)
LYMPHOCYTES # BLD AUTO: 1.5 K/UL (ref 1–4.8)
LYMPHOCYTES NFR BLD: 18.2 % (ref 18–48)
MCH RBC QN AUTO: 27.8 PG (ref 27–31)
MCHC RBC AUTO-ENTMCNC: 33.9 G/DL (ref 32–36)
MCV RBC AUTO: 82 FL (ref 82–98)
MONOCYTES # BLD AUTO: 1.2 K/UL (ref 0.3–1)
MONOCYTES NFR BLD: 14.4 % (ref 4–15)
NEUTROPHILS # BLD AUTO: 5.2 K/UL (ref 1.8–7.7)
NEUTROPHILS NFR BLD: 64.5 % (ref 38–73)
NRBC BLD-RTO: 0 /100 WBC
PLATELET # BLD AUTO: 418 K/UL (ref 150–350)
PMV BLD AUTO: 9.8 FL (ref 9.2–12.9)
RBC # BLD AUTO: 4.74 M/UL (ref 4.6–6.2)
WBC # BLD AUTO: 8.13 K/UL (ref 3.9–12.7)

## 2020-08-25 PROCEDURE — 94761 N-INVAS EAR/PLS OXIMETRY MLT: CPT

## 2020-08-25 PROCEDURE — 36415 COLL VENOUS BLD VENIPUNCTURE: CPT

## 2020-08-25 PROCEDURE — 25000003 PHARM REV CODE 250: Performed by: SURGERY

## 2020-08-25 PROCEDURE — 25000003 PHARM REV CODE 250: Performed by: STUDENT IN AN ORGANIZED HEALTH CARE EDUCATION/TRAINING PROGRAM

## 2020-08-25 PROCEDURE — 85025 COMPLETE CBC W/AUTO DIFF WBC: CPT

## 2020-08-25 RX ORDER — OXYCODONE AND ACETAMINOPHEN 5; 325 MG/1; MG/1
1 TABLET ORAL EVERY 4 HOURS PRN
Qty: 28 TABLET | Refills: 0 | Status: SHIPPED | OUTPATIENT
Start: 2020-08-25

## 2020-08-25 RX ORDER — CLINDAMYCIN HYDROCHLORIDE 300 MG/1
300 CAPSULE ORAL EVERY 6 HOURS
Qty: 40 CAPSULE | Refills: 0 | Status: SHIPPED | OUTPATIENT
Start: 2020-08-25 | End: 2020-09-04

## 2020-08-25 RX ADMIN — ACETAMINOPHEN 650 MG: 325 TABLET ORAL at 06:08

## 2020-08-25 RX ADMIN — OXYCODONE HYDROCHLORIDE 5 MG: 5 TABLET ORAL at 09:08

## 2020-08-25 RX ADMIN — ACETAMINOPHEN 650 MG: 325 TABLET ORAL at 01:08

## 2020-08-25 RX ADMIN — CLINDAMYCIN IN 5 PERCENT DEXTROSE 900 MG: 18 INJECTION, SOLUTION INTRAVENOUS at 06:08

## 2020-08-25 RX ADMIN — ACETAMINOPHEN 650 MG: 325 TABLET ORAL at 12:08

## 2020-08-25 NOTE — NURSING
Gave patient discharge instructions printed in English and Setswana, prescriptions using the Language line/[phone]/Melissa  ID# [id number]. Patient states his wife will assist with dressing changes, provided supplies as ordered per Dr Sumner. Patient states wife was born here, reads and understands English. Left leg dressing CDI; dressing change was done by dr sumner this am .  No distress noted. States wife will , transporter requested.

## 2020-08-25 NOTE — PLAN OF CARE
08/25/20 1155   Final Note   Assessment Type Final Discharge Note   Anticipated Discharge Disposition Home   What phone number can be called within the next 1-3 days to see how you are doing after discharge? [phone]   Hospital Follow Up  Appt(s) scheduled? Yes   Discharge plans and expectations educations in teach back method with documentation complete? Yes   Right Care Referral Info   Post Acute Recommendation No Care   Post-Acute Status   Discharge Delays None known at this time     EDUCATION:  Patient After Visit Summary (AVS) updated to include educational information on Cellulitis that will be printed on patient's AVS to review upon discharge; Information reviewed with patient that include: signs and symptoms to look for and call the doctor if experiencing, and symptoms that may indicate a medical emergency: CALL 911.            SW included things he/she is responsible for when discharged to help with his/her recovery:  Particularly on how to Manage his/her care at home:  1. Getting his prescriptions filled.  2. Taking his medications as directed. DO NOT MISS ANY DOSES!  3. Going to his follow-up doctor appointments.       Help at home will be from pt's spouse and children, assisting in pt's recovery.        Nurse, Loyda, notified that all CM needs have been met.

## 2020-08-25 NOTE — DISCHARGE SUMMARY
[facility]  General Surgery  Discharge Summary      Patient Name: Francisco Diaz  MRN: [id number]  Admission Date: 8/21/2020  Hospital Length of Stay: 1 days  Discharge Date and Time: 8/25/2020  2:52 PM  Attending Physician: Celestine Delgado MD   Discharging Provider: Estephania Sotomayor MD  Primary Care Provider: Primary Doctor No    HPI:   30 y.o. male with no significant PMHx, who presents with LLE swelling, erythema, and pain. He states that his symptoms onset approximately 2 weeks ago after he fell off the back of a truck and hit his anteromedial leg on the bumper. His symptoms progressively worsened. He states he is unable to walk due to the pain. He is able to bend his knee. He denies associated fever, chills, nausea, vomiting, or other systemic symptoms.        Indwelling Lines/Drains at time of discharge:   Lines/Drains/Airways     None               Hospital Course: Patient admitted with abscess and cellulitis of LLE, started on antibiotics and underwent I&D on admission. Cultures grew pan sensitive MSSA. Depsite IV antibiotics, patient with persistent cellulitis after a couple days. Underwent additional bedside I&D of undrained fluid collection. After second I&D, cellulitis began to improve and wbc normalizaed    Consults: none    Significant Diagnostic Studies: CT, US, labs    Pending Diagnostic Studies:     None        Final Active Diagnoses:    Diagnosis Date Noted POA    PRINCIPAL PROBLEM:  Abscess of lower leg [L02.419] 08/22/2020 Yes    Cellulitis of left leg [L03.116] 08/24/2020 Yes      Problems Resolved During this Admission:      Discharged Condition: stable    Disposition: Home or Self Care    Follow Up:    Patient Instructions:      Diet Adult Regular     Notify your health care provider if you experience any of the following:  temperature >100.4     Notify your health care provider if you experience any of the following:  severe uncontrolled pain     Notify your  health care provider if you experience any of the following:  redness, tenderness, or signs of infection (pain, swelling, redness, odor or green/yellow discharge around incision site)     Change dressing (specify)   Order Comments: Daily packing change with 1/2 in plain packing. Cover with gauze. OK to shower after packing removed prior to replacing. Let water run over wound, no submerging in water     Activity as tolerated     Medications:  Reconciled Home Medications:      Medication List      START taking these medications    clindamycin 300 MG capsule  Commonly known as: CLEOCIN  Take 1 capsule (300 mg total) by mouth every 6 (six) hours. for 10 days     oxyCODONE-acetaminophen 5-325 mg per tablet  Commonly known as: PERCOCET  Take 1 tablet by mouth every 4 (four) hours as needed for Pain.          Time spent on the discharge of patient: 45 minutes    Estephania Sotomayor MD  General Surgery  [facility]

## 2020-08-25 NOTE — DISCHARGE INSTRUCTIONS
WRITTEN HEALTHCARE DISCHARGE INFORMATION     Things that YOU are RESPONSIBLE for to Manage Your Care At Home:    1. Getting your prescriptions filled.  2. Taking you medications as directed. DO NOT MISS ANY DOSES!  3. Going to your follow-up doctor appointments. This is important because it allows the doctor to monitor your progress and to determine if any changes need to be made to your treatment plan.    If you are unable to make your follow up appointments, please call the number listed and reschedule this appointment.     ____________HELP AT HOME____________________    Experiencing any SIGNS or SYMPTOMS: YOU CAN    Schedule a same day appopintment with your Primary Care Doctor or  you can call [facility] Line for 24/7 assistance at [phone]    If you are experience any signs or symptoms that have become severe, Call 911 and come to your nearest Emergency Room.    Thank you for choosing [facility] and allowing us to care for you.   From your care management team: Leatha Mendoza LMSW, [phone]  Follow-up Information     [facility]a Huertas. Schedule an appointment as soon as possible for a visit in 1 week.    Why: Establish Primary Care   Contact information:  [address] [place] [address] [phone]

## 2020-08-25 NOTE — PLAN OF CARE
Patient remained free from falls and injury during shift, medication administered per md orders, IV abx administered, bandage to LLE CDI, patient is able to make needs known through out shift, patient safety maintained bed in low lock position, will continue to monitor.

## 2020-08-26 LAB
BACTERIA BLD CULT: NORMAL
BACTERIA BLD CULT: NORMAL
BACTERIA SPEC ANAEROBE CULT: NORMAL

## 2020-08-28 ENCOUNTER — HOSPITAL ENCOUNTER (EMERGENCY)
Facility: HOSPITAL | Age: 30
Discharge: HOME | End: 2020-08-28
Attending: EMERGENCY MEDICINE

## 2020-08-28 VITALS
HEART RATE: 86 BPM | SYSTOLIC BLOOD PRESSURE: 129 MMHG | OXYGEN SATURATION: 95 % | BODY MASS INDEX: 30.66 KG/M2 | HEIGHT: 65 IN | RESPIRATION RATE: 18 BRPM | DIASTOLIC BLOOD PRESSURE: 77 MMHG | TEMPERATURE: 98 F | WEIGHT: 184 LBS

## 2020-08-28 DIAGNOSIS — Z51.89 ENCOUNTER FOR WOUND RE-CHECK: Primary | ICD-10-CM

## 2020-08-28 PROCEDURE — 99281 EMR DPT VST MAYX REQ PHY/QHP: CPT

## 2020-08-28 NOTE — ED TRIAGE NOTES
Patient reports recent discharge from this facility where he had an I&D done to his left lower leg. States he was instructed to go to clinic for wound check, but clinic is only doing virtual visits at the time so he came to ED to have wound checked. Wound dressing and packing removed. No purulent drainage, foul odor, redness, swelling or warmth noted to the area. Patient denies any of the above along with any fever.

## 2020-08-28 NOTE — ED PROVIDER NOTES
Encounter Date: 8/28/2020       History     Chief Complaint   Patient presents with    Post-op Problem     Pt reports he was hospitalized for infection to LEFT leg and had surgery done to remove it. Pt states his wound keeps opening up. Denies new drainage. Denies pain at this time    Wound Check     30-year-old male with recent cellulitis and abscess of the left leg presents for evaluation of wound.  He was discharged 2 days ago where he was treated for pansensitive Staph infection with two I and D procedures, and discharged with clindamycin.  He has been compliant with clindamycin, reporting improving pain.  Denies fever or chills.  He explains he is unsure of what to do as far is taking care of his wound and follow-up appointments.  He called the phone number on the discharge paperwork, but says they will only do a video call.        Review of patient's allergies indicates:  No Known Allergies  History reviewed. No pertinent past medical history.  History reviewed. No pertinent surgical history.  History reviewed. No pertinent family history.  Social History     Tobacco Use    Smoking status: Never Smoker    Smokeless tobacco: Never Used   Substance Use Topics    Alcohol use: Yes     Alcohol/week: 3.0 standard drinks     Types: 3 Cans of beer per week    Drug use: Never     Review of Systems   Constitutional: Negative for fever.   HENT: Negative for sore throat.    Respiratory: Negative for shortness of breath.    Cardiovascular: Negative for chest pain.   Gastrointestinal: Negative for nausea.   Genitourinary: Negative for dysuria.   Musculoskeletal: Negative for back pain.   Skin: Positive for wound. Negative for rash.   Neurological: Negative for weakness.   Hematological: Does not bruise/bleed easily.       Physical Exam     Initial Vitals [08/28/20 1256]   BP Pulse Resp Temp SpO2   (!) 142/89 105 18 98.9 °F (37.2 °C) 99 %      MAP       --         Physical Exam    Vitals reviewed.  Constitutional: He  appears well-developed and well-nourished. He is not diaphoretic. No distress.   HENT:   Head: Normocephalic and atraumatic.   Right Ear: External ear normal.   Left Ear: External ear normal.   Nose: Nose normal.   Eyes: Conjunctivae are normal. No scleral icterus.   Neck: Normal range of motion. Neck supple.   Cardiovascular: Normal rate, regular rhythm and intact distal pulses.   Pulmonary/Chest: No respiratory distress.   Abdominal: Soft.   Musculoskeletal: Normal range of motion.   Neurological: He is alert and oriented to person, place, and time.   Skin: Skin is warm and dry. No rash noted.   4 cm open wound to left anterior leg.  Minimal surrounding erythema.  No tenderness, crepitus, or fluctuance.  No purulent drainage.         ED Course   Procedures  Labs Reviewed - No data to display       Imaging Results    None          Medical Decision Making:   ED Management:  Wound appears to be 0 healing without complication.  Minimal surrounding erythema.  Patient reports improving symptoms.  He is afebrile with reassuring vital signs.  Instructed to continue clindamycin and dressing changes.  Given detailed instructions for [facility] clinic follow-up.  Also given return precautions.  All questions answered.  Patient is comfortable with the plan.  [employer]  service used for all conversations.                                 Clinical Impression:       ICD-10-CM ICD-9-CM   1. Encounter for wound re-check  Z51.89 V58.89             ED Disposition Condition    Discharge Stable        ED Prescriptions     None        Follow-up Information     Follow up With Specialties Details Why Contact Info    [facility]    [address] [facility] [address]  [phone]      [facility] Emergency Medicine Go to  If symptoms worsen [address]  [facility] [address] [phone]                                     Grant Hathaway PA-C  08/28/20 3188